# Patient Record
Sex: FEMALE | Race: OTHER
[De-identification: names, ages, dates, MRNs, and addresses within clinical notes are randomized per-mention and may not be internally consistent; named-entity substitution may affect disease eponyms.]

---

## 2020-01-05 ENCOUNTER — HOSPITAL ENCOUNTER (OUTPATIENT)
Dept: HOSPITAL 25 - ED | Age: 50
Setting detail: OBSERVATION
LOS: 1 days | Discharge: HOME | End: 2020-01-06
Attending: INTERNAL MEDICINE | Admitting: INTERNAL MEDICINE
Payer: SELF-PAY

## 2020-01-05 DIAGNOSIS — Z87.442: ICD-10-CM

## 2020-01-05 DIAGNOSIS — N13.2: Primary | ICD-10-CM

## 2020-01-05 DIAGNOSIS — R10.84: ICD-10-CM

## 2020-01-05 DIAGNOSIS — M54.5: ICD-10-CM

## 2020-01-05 DIAGNOSIS — R11.0: ICD-10-CM

## 2020-01-05 DIAGNOSIS — F17.210: ICD-10-CM

## 2020-01-05 LAB
ALBUMIN SERPL BCG-MCNC: 3.9 G/DL (ref 3.2–5.2)
ALBUMIN/GLOB SERPL: 1.6 {RATIO} (ref 1–3)
ALP SERPL-CCNC: 56 U/L (ref 34–104)
ALT SERPL W P-5'-P-CCNC: 27 U/L (ref 7–52)
ANION GAP SERPL CALC-SCNC: 8 MMOL/L (ref 2–11)
AST SERPL-CCNC: 20 U/L (ref 13–39)
BASOPHILS # BLD AUTO: 0.1 10^3/UL (ref 0–0.2)
BUN SERPL-MCNC: 23 MG/DL (ref 6–24)
BUN/CREAT SERPL: 29.9 (ref 8–20)
CALCIUM SERPL-MCNC: 8.8 MG/DL (ref 8.6–10.3)
CHLORIDE SERPL-SCNC: 105 MMOL/L (ref 101–111)
EOSINOPHIL # BLD AUTO: 0 10^3/UL (ref 0–0.6)
GLOBULIN SER CALC-MCNC: 2.4 G/DL (ref 2–4)
GLUCOSE SERPL-MCNC: 114 MG/DL (ref 70–100)
HCO3 SERPL-SCNC: 23 MMOL/L (ref 22–32)
HCT VFR BLD AUTO: 40 % (ref 35–47)
HGB BLD-MCNC: 13.6 G/DL (ref 12–16)
LYMPHOCYTES # BLD AUTO: 1.1 10^3/UL (ref 1–4.8)
MCH RBC QN AUTO: 30 PG (ref 27–31)
MCHC RBC AUTO-ENTMCNC: 34 G/DL (ref 31–36)
MCV RBC AUTO: 90 FL (ref 80–97)
MONOCYTES # BLD AUTO: 0.4 10^3/UL (ref 0–0.8)
NEUTROPHILS # BLD AUTO: 7.7 10^3/UL (ref 1.5–7.7)
NRBC # BLD AUTO: 0 10^3/UL
NRBC BLD QL AUTO: 0
PLATELET # BLD AUTO: 234 10^3/UL (ref 150–450)
POTASSIUM SERPL-SCNC: 3.9 MMOL/L (ref 3.5–5)
PROT SERPL-MCNC: 6.3 G/DL (ref 6.4–8.9)
RBC # BLD AUTO: 4.5 10^6 /UL (ref 3.7–4.87)
RBC UR QL AUTO: (no result)
SODIUM SERPL-SCNC: 136 MMOL/L (ref 135–145)
WBC # BLD AUTO: 9.4 10^3/UL (ref 3.5–10.8)
WBC UR QL AUTO: (no result)

## 2020-01-05 PROCEDURE — 96375 TX/PRO/DX INJ NEW DRUG ADDON: CPT

## 2020-01-05 PROCEDURE — 81003 URINALYSIS AUTO W/O SCOPE: CPT

## 2020-01-05 PROCEDURE — 83690 ASSAY OF LIPASE: CPT

## 2020-01-05 PROCEDURE — 96372 THER/PROPH/DIAG INJ SC/IM: CPT

## 2020-01-05 PROCEDURE — 74176 CT ABD & PELVIS W/O CONTRAST: CPT

## 2020-01-05 PROCEDURE — G0378 HOSPITAL OBSERVATION PER HR: HCPCS

## 2020-01-05 PROCEDURE — 80053 COMPREHEN METABOLIC PANEL: CPT

## 2020-01-05 PROCEDURE — 36415 COLL VENOUS BLD VENIPUNCTURE: CPT

## 2020-01-05 PROCEDURE — 81015 MICROSCOPIC EXAM OF URINE: CPT

## 2020-01-05 PROCEDURE — 96361 HYDRATE IV INFUSION ADD-ON: CPT

## 2020-01-05 PROCEDURE — 80048 BASIC METABOLIC PNL TOTAL CA: CPT

## 2020-01-05 PROCEDURE — 85025 COMPLETE CBC W/AUTO DIFF WBC: CPT

## 2020-01-05 PROCEDURE — 87086 URINE CULTURE/COLONY COUNT: CPT

## 2020-01-05 PROCEDURE — 96365 THER/PROPH/DIAG IV INF INIT: CPT

## 2020-01-05 PROCEDURE — 86140 C-REACTIVE PROTEIN: CPT

## 2020-01-05 PROCEDURE — 74018 RADEX ABDOMEN 1 VIEW: CPT

## 2020-01-05 PROCEDURE — 99284 EMERGENCY DEPT VISIT MOD MDM: CPT

## 2020-01-05 PROCEDURE — 96376 TX/PRO/DX INJ SAME DRUG ADON: CPT

## 2020-01-05 RX ADMIN — TAMSULOSIN HYDROCHLORIDE SCH MG: 0.4 CAPSULE ORAL at 19:12

## 2020-01-05 RX ADMIN — ONDANSETRON PRN MG: 2 INJECTION INTRAMUSCULAR; INTRAVENOUS at 21:44

## 2020-01-05 RX ADMIN — SODIUM CHLORIDE SCH MLS/HR: 900 IRRIGANT IRRIGATION at 20:25

## 2020-01-05 RX ADMIN — HEPARIN SODIUM SCH UNITS: 5000 INJECTION INTRAVENOUS; SUBCUTANEOUS at 21:03

## 2020-01-05 NOTE — ED
Abdominal Pain/Female





- HPI Summary


HPI Summary: 





Patient is a 48 y/o F presenting to the ED for a chief complaint of left flank 

pain that radiates to the lower back. Patient is present with her daughter. 

Patient reports that the left flank pain began around 17:00 on 01/04/20. She 

rates her flank pain as 10/10 in severity. For the last 3 days, she also notes 

hematuria. Patient complains of nausea and believes she has had a fever. Any 

aggravating or alleviating factors are denied. Patient admits tobacco and 

alcohol use, but denies drug use. PMHx is significant for nephrolithiasis, but 

she denies a history of hypertension, hypercholesterolemia, or diabetes 

mellitus. PSHx is significant for appendectomy at which time she was told she 

had nephrolithiasis, but she is unsure how large the stone was at that time. 

FMHx is significant for hypertension and diabetes mellitus. She denies taking 

any medications. Allergies noted. 








- History of Current Complaint


Chief Complaint: EDFlankPain


Stated Complaint: BACK PAIN/CHILLS/VOMITING PER PT DAUGHTER


Time Seen by Provider: 01/05/20 13:18


Hx Obtained From: Patient


Onset/Duration: Sudden Onset, Lasting Hours, Still Present


Timing: Constant


Severity Initially: Severe


Severity Currently: Severe


Pain Intensity: 10


Pain Scale Used: 0-10 Numeric


Location: Flank - Left


Radiates: Yes


Radiates to: Back - Lower


Aggravating Factor(s): Nothing


Alleviating Factor(s): Nothing


Associated Signs and Symptoms: Positive: Fever - In vitals, 97.8 F, Back Pain - 

Lower that radiates from the left flank, Urinary Symptoms - Positive hematuria, 

Nausea


Allergies/Adverse Reactions: 


 Allergies











Allergy/AdvReac Type Severity Reaction Status Date / Time


 


No Known Allergies Allergy   Verified 01/05/20 13:06











Home Medications: 


 Home Medications





NK [No Home Medications Reported]  01/05/20 [History Confirmed 01/05/20]











PMH/Surg Hx/FS Hx/Imm Hx


Previously Healthy: Yes


Endocrine/Hematology History: 


   Denies: Hx Diabetes


Cardiovascular History: 


   Denies: Hx Hypercholesterolemia, Hx Hypertension


 History: Reports: Hx Kidney Stones


Sensory History: 


   Denies: Hx Legally Blind, Hx Deafness


Opthamlomology History: 


   Denies: Hx Legally Blind


EENT History: 


   Denies: Hx Deafness





- Surgical History


Surgical History: Yes


Surgery Procedure, Year, and Place: Appendectomy


Infectious Disease History: No


Infectious Disease History: 


   Denies: Traveled Outside the [place] in Last 30 Days





- Family History


Known Family History: Positive: Hypertension, Diabetes





- Social History


Occupation: Unemployed


Alcohol Use: Occasionally


Hx Substance Use: No


Substance Use Type: Reports: None


Hx Tobacco Use: Yes


Smoking Status (MU): Light Every Day Tobacco Smoker


Type: Cigarettes





Review of Systems


Positive: Fever - In vitals, 97.8 F


Positive: Abdominal Pain - Left flank, Nausea


Positive: flank pain - Left, hematuria


Positive: Myalgia - Lower back that radiates from the left flank


All Other Systems Reviewed And Are Negative: Yes





Physical Exam





- Summary


Physical Exam Summary: 





VITAL SIGNS: Reviewed.


GENERAL: Patient is a well-developed and nourished female who is lying 

comfortable in the stretcher. Patient is not in any acute respiratory distress.


HEAD AND FACE: Normocephalic and atraumatic.


EYES: PERRLA, EOMI x 2, No injected conjunctiva.


EARS: Hearing grossly intact. Ear canals and tympanic membranes are WNL.


MOUTH: Oropharynx within normal limits.


NECK: Supple, trachea is midline, no adenopathy, no JVD.


CHEST: Symmetric, no tenderness at palpation.


LUNGS: Clear to auscultation bilaterally. No wheezing or crackles.


CVS: RRR, S1 and S2 present, no murmurs or gallops appreciated.


ABDOMEN: Soft. No signs of distention. Positive bowel sounds. No rebound, no 

guarding, and no masses palpated. No abdominal bruit or pulsations.Left flank 

tenderness, CVA tenderness. 


EXTREMITIES: FROM in all major joints, no edema, no cyanosis or clubbing.


NEURO: Alert and oriented x 3. No acute neurological deficits. Speech is normal.


SKIN: Dry and warm.


Triage Information Reviewed: Yes


Vital Signs On Initial Exam: 


 Initial Vitals











Temp Pulse Resp BP Pulse Ox


 


 97.8 F   77   16   130/99   99 


 


 01/05/20 13:03  01/05/20 13:03  01/05/20 13:03  01/05/20 13:03  01/05/20 13:03











Vital Signs Reviewed: Yes





Procedures





- Sedation


Patient Received Moderate/Deep Sedation with Procedure: No





Diagnostics





- Vital Signs


 Vital Signs











  Temp Pulse Resp BP Pulse Ox


 


 01/05/20 13:03  97.8 F  77  16  130/99  99














- Laboratory


Result Diagrams: 


 01/05/20 14:07





 01/05/20 14:07


Lab Statement: Any lab studies that have been ordered have been reviewed, and 

results considered in the medical decision making process.





- Radiology


  ** Abdomen X-ray


Radiology Interpretation Completed By: Radiologist


Summary of Radiographic Findings: Abdomen X-ray IMPRESSION: Calculi overlying 

the left renal hilum. This is similar to that identified on recent CT.  

Reviewed by Dr. Gooden.





- CT


  ** Abdomen/Pelvis CT


CT Interpretation Completed By: Radiologist


Summary of CT Findings: Abdomen/Pelvis CT IMPRESSION: Calculi in the left 

ureteropelvic junction measuring 0.8 x 0.9 cm with moderate left 

hydronephrosis. Right kidney shows no hydronephrosis.  Reviewed by Dr. Gooden.





Re-Evaluation





- Re-Evaluation


  ** First Eval


Re-Evaluation Time: 16:06


Change: Improved


Comment: At 16:06, patient is feeling better and rates her current pain as 4-5/

10 in severity.





Abdominal Pain Fem Course/Dx





- Course


Course Of Treatment: Patient is a 48 y/o F presenting to the ED for a chief 

complaint of left flank pain that radiates to the lower back. Patient is 

present with her daughter. Patient reports that the left flank pain began 

around 17:00 on 01/04/20. She rates her flank pain as 10/10 in severity. For 

the last 3 days, she also notes hematuria. Patient complains of nausea and 

believes she has had a fever. Any aggravating or alleviating factors are 

denied. Patient admits tobacco and alcohol use, but denies drug use. PMHx is 

significant for nephrolithiasis, but she denies a history of hypertension, 

hypercholesterolemia, or diabetes mellitus. PSHx is significant for 

appendectomy at which time she was told she had nephrolithiasis, but she is 

unsure how large the stone was at that time. FMHx is significant for 

hypertension and diabetes mellitus. She denies taking any medications. 

Allergies noted.  Blood work is without any significant abnormality except for 

glucose of 114, total protein of 6.3 and urinalysis is negative for UTI.  

Abdominal pelvic CT impression: Calculi in the left uteropelvic junction 

measuring 0.80.9 cm with moderate left hydronephrosis. The right kidney shows 

no hydronephrosis.  In the ED course the patient was given IV fluids, Toradol 

for the pain, and 2 doses of morphine. The pain is still present.  I discussed 

my physical exam findings and test results with Dr. Sr from  and he stated 

he would consult with the patient. I discussed my physical exam findings and 

test results with Dr. Arellano from the hospital services, and she will admit the 

patient to her services for further workup and management.  The patient is 

hemodynamically stable, and alert and oriented 3.  The patient will not be 

eating by mouth after midnight.





- Diagnoses


Provider Diagnoses: 


 Renal colic, Nephrolithiasis








- Provider Notifications


Discussed Care Of Patient With: Meseret Arellano - At 17:25, Dr. Meseret Arellano 

reviewed the patients case and agrees to admit the patient to [facility]. 


Time Discussed With Above Provider: 17:25


Instructed by Provider To: Admit As Inpatient





Discharge ED





- Sign-Out/Discharge


Documenting (check all that apply): Patient Departure - Admit





- Discharge Plan


Condition: Stable


Disposition: ADMITTED TO [facility]


Referrals: 


[facility] [Outside]





- Billing Disposition and Condition


Condition: STABLE


Disposition: Admitted to [facility]





- Attestation Statements


Document Initiated by Scribe: Yes


Documenting Scribe: Nickie Beverly


Provider For Whom Scribe is Documenting (Include Credential): Sal Gooden MD


Scribe Attestation: 


INickie, scribed for Sal Gooden MD on 01/05/20 at 1823. 


Scribe Documentation Reviewed: Yes


Provider Attestation: 


The documentation as recorded by the Nickie jose accurately reflects 

the service I personally performed and the decisions made by me, Sal Gooden MD


Status of Scribe Document: Viewed

## 2020-01-05 NOTE — HP
HISTORY AND PHYSICAL:

 

DATE OF ADMISSION:  20

 

ATTENDING PHYSICIAN:  Dr. Enriquez.* (DICTATED BY ANU LIMA)

 

PRIMARY CARE PROVIDER:  None.

 

CHIEF COMPLAINT:  Left flank pain and nausea.

 

HISTORY OF PRESENT ILLNESS:  Maude Stratton is a 49-year-old  female 
without significant past medical history, who presents to the emergency 
department today due to left flank pain x2 days with associated nausea.  The 
patient has left low back pain that started yesterday, has been getting 
progressively worse.  She is feeling nauseous today.  Her pain feels minimally 
improved after administration of morphine in the emergency room as well as 
Toradol, however, still present.  She was having nausea and this has improved 
greatly with Zofran.  She additionally endorses a headache.  Of note, the 
patient speaks only [place] and her neighbor is present for translation.  The 
patient notes that she has never had an episode of this before.  She had an 
incidental finding of nephrolithiasis when she had imaging before her 
appendicitis, however, never had symptoms of such.  She does not take any 
medications daily or oral supplements.  She has been having a headache; however
, this has improved.  She denies fever, chills, chest pain, difficulty breathing
, vomiting, abdominal pain, hematuria, or dysuria.

 

EMERGENCY DEPARTMENT COURSE:  The patient arrived to the emergency department.  
Her vital signs were temperature 97.8 degrees Fahrenheit, pulse 77, respiratory 
rate 16, oxygen saturation 99% on room air, blood pressure 130/99.  The patient 
received 30 mg IV Toradol, 4 mg of IV morphine on 2 occasions as well as 4 mg 
of IV Zofran and a liter bolus of normal saline.  The patient's case was 
discussed with Dr. Sr and hospitalist were asked to evaluate the patient for 
admission.

 

PAST MEDICAL HISTORY:  None.

 

PAST SURGICAL HISTORY:

1.  Appendectomy.

2.  Hysterectomy.

 

HOME MEDICATIONS:  None.

 

ALLERGIES:  None.

 

FAMILY HISTORY:  Her parents are both .  She has no family history of 
diabetes, heart disease, or hypertension.  The cause of death of her parents is 
unclear.

 

SOCIAL HISTORY:  The patient is from [place].  She has been living in the  for 
2 months.  She does not have a job or primary care provider.  She denies 
alcohol use or drug use.  She smokes 1 cigarette per week.  Her boyfriend lives 
in the area and her neighbor, Felicia Lemos, is her surrogate medical decision 
maker should she need one, phone #[phone].

 

DIAGNOSTIC STUDIES/LAB DATA:  White blood cell count 9.4, hemoglobin 13.6, 
hematocrit 40, platelet count 234.  Sodium 136, potassium 3.9, chloride 105, 
carbon dioxide 23, anion gap 8, BUN 23, creatinine 0.77, glucose 114.  
Unremarkable LFTs. Urine:  +2 blood, +3 rbc's, present squamous epithelial cells
, present urine yeast.

 

CT abdomen and pelvis:  Calculi in the left ureteropelvic junction measuring 
0.8 x 0.9 cm with moderate left hydronephrosis.  Right kidney shows no 
hydronephrosis. 



KUB x-ray, impression:  Calculi overlying the left renal hilum.  There is 
similar to that identified on the recent CT.

 

ASSESSMENT AND PLAN:  Maude Stratton is a 49-year-old  female without a 
significant past medical history, who presents to the emergency department due 
to left flank pain and found to have a nephrolithiasis.  The patient will be 
admitted OBV for:

1.  Left renal calculus with hydronephrosis.  The patient has associated 
moderate left hydronephrosis; however, at this point, she does not have any 
associated kidney injury.  I will be continuing to monitor this.  She has no 
evidence of sepsis at this time as her vitals are stable and no leukocytosis.  
Her urine culture is pending.  I will order ceftriaxone to be given now and 
daily for empiric coverage.  Given her obstructing calculus, I will order 
Flomax and 100 cc/hour normal saline.  Dr. Gooden in the emergency department 
discussed this case with Dr. Sr who recommended KUB today and tomorrow for 
comparison of any possible movement of the stone.  If there is no movement, Dr. Sr plans for surgery tomorrow.  The patient will be n.p.o. except for water 
and meds starting now.  I will continue pain control for this patient and 
continue to monitor her vitals.

2.  Hypertension.  The patient was minimally hypertensive when she arrived to 
the emergency department.  I suspect this is related to pain; this has 
improved.  We will continue to monitor this.

3.  FEN.  The patient is n.p.o. except for meds as previously mentioned. 
Electrolytes are within normal limits and no need for repletion and hydration 
as previously mentioned.

4.  DVT prophylaxis:  The patient has a DVT risk score of 2.  I will order 
subcu heparin and I have ordered morning heparin tomorrow to be held given that 
Dr. Sr will be taking the patient to the OR.

5.  Code status:  The patient is full code.

 

EARLY DISCHARGE PLANNING:  The patient should be set up with [facility] 
followup as she has no local PCP due to her moving here recently in the last 2 
months.

 

This case has been reviewed by my attending physician, Dr. Rubi Enriquez 
and she agrees with this plan.

 

TIME SPENT:  Time spent on this admission approximately 45 minutes, 
approximately half of the time was spent at bedside evaluating the patient and 
discussing the plan of care.

 

 ____________________________________ ANU LIMA

 

[id number]/CPS #: [id number]

MTDD

## 2020-01-06 VITALS — SYSTOLIC BLOOD PRESSURE: 125 MMHG | DIASTOLIC BLOOD PRESSURE: 73 MMHG

## 2020-01-06 LAB
ANION GAP SERPL CALC-SCNC: 6 MMOL/L (ref 2–11)
BASOPHILS # BLD AUTO: 0 10^3/UL (ref 0–0.2)
BUN SERPL-MCNC: 19 MG/DL (ref 6–24)
BUN/CREAT SERPL: 20.9 (ref 8–20)
CALCIUM SERPL-MCNC: 8.6 MG/DL (ref 8.6–10.3)
CHLORIDE SERPL-SCNC: 108 MMOL/L (ref 101–111)
EOSINOPHIL # BLD AUTO: 0.1 10^3/UL (ref 0–0.6)
GLUCOSE SERPL-MCNC: 100 MG/DL (ref 70–100)
HCO3 SERPL-SCNC: 24 MMOL/L (ref 22–32)
HCT VFR BLD AUTO: 39 % (ref 35–47)
HGB BLD-MCNC: 13.4 G/DL (ref 12–16)
LYMPHOCYTES # BLD AUTO: 1.1 10^3/UL (ref 1–4.8)
MCH RBC QN AUTO: 31 PG (ref 27–31)
MCHC RBC AUTO-ENTMCNC: 34 G/DL (ref 31–36)
MCV RBC AUTO: 89 FL (ref 80–97)
MONOCYTES # BLD AUTO: 0.7 10^3/UL (ref 0–0.8)
NEUTROPHILS # BLD AUTO: 6.8 10^3/UL (ref 1.5–7.7)
NRBC # BLD AUTO: 0 10^3/UL
NRBC BLD QL AUTO: 0
PLATELET # BLD AUTO: 210 10^3/UL (ref 150–450)
POTASSIUM SERPL-SCNC: 3.9 MMOL/L (ref 3.5–5)
RBC # BLD AUTO: 4.38 10^6 /UL (ref 3.7–4.87)
SODIUM SERPL-SCNC: 138 MMOL/L (ref 135–145)
WBC # BLD AUTO: 8.8 10^3/UL (ref 3.5–10.8)

## 2020-01-06 RX ADMIN — HEPARIN SODIUM SCH: 5000 INJECTION INTRAVENOUS; SUBCUTANEOUS at 04:19

## 2020-01-06 RX ADMIN — TAMSULOSIN HYDROCHLORIDE SCH: 0.4 CAPSULE ORAL at 11:46

## 2020-01-06 RX ADMIN — ONDANSETRON PRN MG: 2 INJECTION INTRAMUSCULAR; INTRAVENOUS at 09:41

## 2020-01-06 RX ADMIN — FENTANYL CITRATE PRN MCG: 0.05 INJECTION, SOLUTION INTRAMUSCULAR; INTRAVENOUS at 09:35

## 2020-01-06 RX ADMIN — FENTANYL CITRATE PRN MCG: 0.05 INJECTION, SOLUTION INTRAMUSCULAR; INTRAVENOUS at 09:44

## 2020-01-06 RX ADMIN — SODIUM CHLORIDE SCH MLS/HR: 900 IRRIGANT IRRIGATION at 06:48

## 2020-01-06 NOTE — OP
CC:  Dr. Meseret Arellano *

 

DATE OF OPERATION:  01/06/20 - ROOM #415

 

DATE OF BIRTH:  [birth year]

 

SURGEON:  Dr. Sr.

 

ANESTHESIOLOGIST:  General.

 

ANESTHESIA:  Dr. Cordon.

 

PRE-OP DIAGNOSES:

1.  Left hydronephrosis.

2.  Obstructing calculus, left ureteropelvic junction.

 

POST-OP DIAGNOSES:

1.  Left hydronephrosis.

2.  Obstructing calculus, left ureteropelvic junction.

 

OPERATIVE PROCEDURE:

1.  Shockwave lithotripsy, left ureteropelvic junction calculus.

2.  Cystoscopy, left retrograde and left stent insertion.

 

INDICATIONS:  Maude Stratton is a 49-year-old lady who presented to the 
emergency room with left flank pain.  She was noted to have a large obstructing 
calculus at the left ureteropelvic junction.  She had to be admitted for pain 
control and is now being brought in for shockwave lithotripsy and left stent 
insertion.

 

OPERATIVE FINDINGS:  Approximately 9 to 10 mm calculus, left ureteropelvic 
junction with left hydronephrosis.

 

COMPLICATIONS:  None.

 

POSTOPERATIVE CONDITION:  Stable.

 

STENTS USED:  A 7-Ecuadorean stent, left ureter.

 

DESCRIPTION OF PROCEDURE:  After induction of general anesthesia, the patient 
was placed on the lithotripsy table in supine position. The calculus, which was 
at the left ureteropelvic junction, was easily identified on fluoroscopy.  Shock
-wave lithotripsy was commenced at a rate of 60 shocks per minute.  Periodic 
imaging revealed adequate localization and fragmentation and a total of 1400 
shocks were administered.

 

Next, the patient was placed in dorsal lithotomy position and cystoscopy was 
performed.  A guidewire was introduced into the left ureter.  Retrograde 
pyelogram revealed left hydronephrosis and a 7-Ecuadorean stent was positioned 
under fluoroscopy with good proximal and distal positioning obtained.  The 
bladder was emptied.  The patient tolerated the procedure satisfactorily and 
was transferred back to the recovery area in stable condition.

 

 [id number]/CPS #: [id number]

RODRIGO

## 2020-12-29 NOTE — DS
CC:  Dr. Sr *

 

DISCHARGE SUMMARY:

 

DATE OF ADMISSION:  01/05/20

 

DATE OF DISCHARGE:  01/06/20

 

PROVIDER:  ANU Peter

 

ATTENDING PHYSICIAN WHILE IN THE HOSPITAL:  Rubi Enriquez MD * (dictated 
by ANU Peter)

 

PRIMARY CARE PROVIDER:  None.

 

CONSULTING SPECIALIST:  Dr. Sr.

 

PRIMARY DIAGNOSIS:  Left nephrolithiasis with associated left hydronephrosis, 
status post left ureteral stent placement and lithotripsy.

 

SECONDARY DIAGNOSES:  None.

 

PERTINENT STUDIES/LAB DATA:  Creatinine on day of admission 0.77, creatinine on 
day of discharge 0.91, BUN on day of admission 23, BUN on day of discharge 19.  
Urine culture negative.

 

Pertinent studies:  Abdomen and pelvis CT on 01/05/20, calculi in the left 
ureteropelvic junction measuring 0.9 x 0.8 cm with moderate left 
hydronephrosis. Right kidney shows no hydronephrosis.  Abdomen x-ray on day of 
discharge, left nephrolithiasis with a left ureteral stent.  Abdomen x-ray on 01
/05/20, calculi overlying the left renal hilum.

 

HISTORY OF PRESENT ILLNESS/HOSPITAL COURSE:  Maude Stratton is a 49-year-old 
 female without a significant past medical history who presented to the 
emergency department due to left-sided flank pain and nausea. For further 
details, please see the history and physical written by myself on 01/05/20.  
The patient was found to have left-sided nephrolithiasis with associated left 
hydronephrosis.  Dr. Sr was consulted and he saw the patient in 
consultation.  The patient was started on empiric ceftriaxone as well as IV 
hydration and Flomax.  Due to the size of the stone, Dr. Sr took the patient 
to the OR for lithotripsy and due to the hydronephrosis, ureteral stent was 
placed.  The patient is new to the country and is [place] speaking only and is 
uninsured and Social Work was involved in the case for these reasons and overall
, the patient remained quite stable during the hospital stay.  After procedure 
with Dr. Sr, her pain was overall resolved.  She did have some nausea, which 
resolved and she was agreeable and safe for discharge. She was afebrile during 
the entire hospital stay and she had no leukocytosis and did not develop NITZA.

 

PHYSICAL EXAM ON DAY OF DISCHARGE:  General:  Middle-aged  female, 
lying in hospital bed, appearing comfortable, in no acute distress after 
procedure.  Head: Normocephalic, atraumatic.  Eyes:  PERRL. Sclerae anicteric.  
ENT:  Mucous membranes moist.  Cardio:  No edema.  Normal capillary refill.  
Abdomen: Soft, nontender, nondistended.  Neuro:  The patient is alert and 
oriented x3.  No focal deficits.  Able to move all extremities.  Skin:  Warm, 
dry, and intact.

 

DISCHARGE PLAN:  Diet:  Regular unrestricted diet.

 

Activity:  The patient should avoid heavy lifting for the next several days 
until followup with Dr. Sr.

 

Discharge plan:  The patient should be following up with Dr. Sr in 1 week.  
I did provide phone number for [facility] if she needs to establish 
primary care in this area.  She otherwise has no medical problems.  I did 
discuss with her that a small amount of blood in the urine is expected for the 
next couple days after this procedure.  I did advise her to take Tylenol for 
pain control if needed. I advised her to return to the emergency department 
should she experience severe abdominal pain, back pain, fever, chills, 
significant blood in the urine, or blood in the urine that continues past time 
of the followup appointment with Dr. Sr. She should maintain good oral 
hydration as well.  I did discuss these discharge instructions with the patient 
and provided written instructions in her native language per my translation, 
though she did have a friend, an English speaking friend, available for 
translation.

 

DISCHARGE MEDICATIONS:

1.  Bactrim double strength 1 tab p.o. b.i.d. x3 days.

2.  Tylenol 650 mg p.o. q.4 hours p.r.n. pain.

 

CONDITION ON DISCHARGE:  Stable.

 

DISPOSITION:  Home. 



TIME SPENT:  Approximately 45 minutes were spent on this discharge, more than 
half of this time was spent at bedside evaluating the patient, discussing the 
plan of care.

 

____________________________________ ANU PETER

 

[id number]/[facility] #: [id number]

MTDD Cheiloplasty (Less Than 50%) Text: A decision was made to reconstruct the defect with a  cheiloplasty.  The defect was undermined extensively.  Additional obicularis oris muscle was excised with a 15 blade scalpel.  The defect was converted into a full thickness wedge, of less than 50% of the vertical height of the lip, to facilite a better cosmetic result.  Small vessels were then tied off with 5-0 monocyrl. The obicularis oris, superficial fascia, adipose and dermis were then reapproximated.  After the deeper layers were approximated the epidermis was reapproximated with particular care given to realign the vermilion border.